# Patient Record
Sex: FEMALE | Race: BLACK OR AFRICAN AMERICAN | NOT HISPANIC OR LATINO | URBAN - METROPOLITAN AREA
[De-identification: names, ages, dates, MRNs, and addresses within clinical notes are randomized per-mention and may not be internally consistent; named-entity substitution may affect disease eponyms.]

---

## 2022-09-10 ENCOUNTER — EMERGENCY (EMERGENCY)
Facility: HOSPITAL | Age: 22
LOS: 1 days | Discharge: HOME | End: 2022-09-10
Admitting: EMERGENCY MEDICINE

## 2022-09-10 VITALS
SYSTOLIC BLOOD PRESSURE: 113 MMHG | HEIGHT: 66 IN | HEART RATE: 68 BPM | DIASTOLIC BLOOD PRESSURE: 66 MMHG | TEMPERATURE: 99 F | WEIGHT: 160.06 LBS | OXYGEN SATURATION: 98 % | RESPIRATION RATE: 15 BRPM

## 2022-09-10 PROCEDURE — 99053 MED SERV 10PM-8AM 24 HR FAC: CPT

## 2022-09-10 PROCEDURE — 99282 EMERGENCY DEPT VISIT SF MDM: CPT
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
## 2022-09-11 NOTE — ED PROVIDER NOTE - NSFOLLOWUPINSTRUCTIONS_ED_ALL_ED_FT
Body Substance Exposure    Body substance exposure is when you come in contact with another person's blood or body fluid that contains blood. Contact may place you at risk for hepatitis B virus (HBV), human immunodeficiency virus (HIV), or hepatitis C virus (HCV). Semen or vaginal fluid can also spread infection.    Ways that body substance exposure can occur:   •A needle stick or a cut from a sharp object  •Contact with an open wound, such as a cut, chapped skin, or an abrasion   •Contact with the eyes or mucus membrane, such as the lining of the mouth or nose  •Human bite    What to do when exposed to a body substance:   •Clean the area immediately. Wash an open wound with soap and clean water. Flush your eyes with saline solution or water. Rinse mucus membranes with water or saline solution.    •Contact your healthcare provider as soon as possible. The provider will ask how the exposure happened and where the blood or body fluid touched your body. If possible, tell your provider about the person's health status and history, such as vaccinations. Treatment works best if started as soon as possible.

## 2022-09-11 NOTE — ED PROVIDER NOTE - PHYSICAL EXAMINATION
Constitutional:  Well appearing, awake, alert, oriented to person, place, time/situation and in no apparent distress  Head:  NC/AT, symmetric  ENMT: Airway patent  Eyes:  Clear bilaterally, pupils equal, round   Cardiac:  Normal rate  Respiratory:  Normal respiratory rate and effort  GI:   Abd soft, non-distended  MSK:  Atraumatic, FROM  Neuro:  Alert and oriented  Skin:  Skin normal color for race, warm, dry, and intact.  Psych:  Normal mood and affect, no apparent risk to self or others.

## 2022-09-11 NOTE — ED PROVIDER NOTE - CLINICAL SUMMARY MEDICAL DECISION MAKING FREE TEXT BOX
22 y/o F presents to ED for evaluation of exposure to blood to her arm.  Pt shows area of abrasion.  The epidermis is intact and therefore, negligible to no risk of exposure to a blood borne pathogen. This was explained to pt.  Pt reassured. 22 y/o F presents to ED for evaluation of exposure to blood to her arm.  Pt shows area of abrasion.  There is subtle erythema present. The epidermis is intact and therefore, negligible to no risk of exposure to a blood borne pathogen. This was explained to pt.  Pt reassured.

## 2022-09-11 NOTE — ED PROVIDER NOTE - PATIENT PORTAL LINK FT
You can access the FollowMyHealth Patient Portal offered by [facility] by registering at the following website: [email or web address]. By joining [facility]’s FollowMyHealth portal, you will also be able to view your health information using other applications (apps) compatible with our system.

## 2022-09-13 DIAGNOSIS — Z88.0 ALLERGY STATUS TO PENICILLIN: ICD-10-CM

## 2022-09-13 DIAGNOSIS — Z77.21 CONTACT WITH AND (SUSPECTED) EXPOSURE TO POTENTIALLY HAZARDOUS BODY FLUIDS: ICD-10-CM

## 2022-09-13 DIAGNOSIS — S50.812A ABRASION OF LEFT FOREARM, INITIAL ENCOUNTER: ICD-10-CM

## 2022-09-13 DIAGNOSIS — L53.9 ERYTHEMATOUS CONDITION, UNSPECIFIED: ICD-10-CM
